# Patient Record
Sex: MALE | Race: WHITE | Employment: OTHER | URBAN - METROPOLITAN AREA
[De-identification: names, ages, dates, MRNs, and addresses within clinical notes are randomized per-mention and may not be internally consistent; named-entity substitution may affect disease eponyms.]

---

## 2022-09-16 ENCOUNTER — HOSPITAL ENCOUNTER (INPATIENT)
Age: 62
LOS: 3 days | Discharge: LEFT BEFORE DISCHARGE | DRG: 917 | End: 2022-09-19
Attending: STUDENT IN AN ORGANIZED HEALTH CARE EDUCATION/TRAINING PROGRAM | Admitting: STUDENT IN AN ORGANIZED HEALTH CARE EDUCATION/TRAINING PROGRAM

## 2022-09-16 ENCOUNTER — APPOINTMENT (OUTPATIENT)
Dept: GENERAL RADIOLOGY | Age: 62
DRG: 917 | End: 2022-09-16
Attending: STUDENT IN AN ORGANIZED HEALTH CARE EDUCATION/TRAINING PROGRAM

## 2022-09-16 DIAGNOSIS — I21.4 NON-STEMI (NON-ST ELEVATED MYOCARDIAL INFARCTION) (HCC): ICD-10-CM

## 2022-09-16 DIAGNOSIS — I21.4 NSTEMI (NON-ST ELEVATED MYOCARDIAL INFARCTION) (HCC): Primary | ICD-10-CM

## 2022-09-16 LAB
ALBUMIN SERPL-MCNC: 3.7 G/DL (ref 3.5–5)
ALBUMIN/GLOB SERPL: 1 {RATIO} (ref 1.1–2.2)
ALP SERPL-CCNC: 94 U/L (ref 45–117)
ALT SERPL-CCNC: 16 U/L (ref 12–78)
AMPHET UR QL SCN: NEGATIVE
ANION GAP SERPL CALC-SCNC: 3 MMOL/L (ref 5–15)
AST SERPL-CCNC: 13 U/L (ref 15–37)
BARBITURATES UR QL SCN: NEGATIVE
BASOPHILS # BLD: 0.1 K/UL (ref 0–0.1)
BASOPHILS NFR BLD: 1 % (ref 0–1)
BENZODIAZ UR QL: NEGATIVE
BILIRUB SERPL-MCNC: 0.3 MG/DL (ref 0.2–1)
BUN SERPL-MCNC: 23 MG/DL (ref 6–20)
BUN/CREAT SERPL: 18 (ref 12–20)
CALCIUM SERPL-MCNC: 9.1 MG/DL (ref 8.5–10.1)
CANNABINOIDS UR QL SCN: POSITIVE
CHLORIDE SERPL-SCNC: 107 MMOL/L (ref 97–108)
CO2 SERPL-SCNC: 27 MMOL/L (ref 21–32)
COCAINE UR QL SCN: POSITIVE
COMMENT, HOLDF: NORMAL
CREAT SERPL-MCNC: 1.3 MG/DL (ref 0.7–1.3)
DIFFERENTIAL METHOD BLD: ABNORMAL
DRUG SCRN COMMENT,DRGCM: ABNORMAL
EOSINOPHIL # BLD: 0.2 K/UL (ref 0–0.4)
EOSINOPHIL NFR BLD: 2 % (ref 0–7)
ERYTHROCYTE [DISTWIDTH] IN BLOOD BY AUTOMATED COUNT: 13 % (ref 11.5–14.5)
ETHANOL SERPL-MCNC: <10 MG/DL
GLOBULIN SER CALC-MCNC: 3.6 G/DL (ref 2–4)
GLUCOSE SERPL-MCNC: 123 MG/DL (ref 65–100)
HCT VFR BLD AUTO: 40.2 % (ref 36.6–50.3)
HGB BLD-MCNC: 13.6 G/DL (ref 12.1–17)
IMM GRANULOCYTES # BLD AUTO: 0.1 K/UL (ref 0–0.04)
IMM GRANULOCYTES NFR BLD AUTO: 1 % (ref 0–0.5)
LYMPHOCYTES # BLD: 1.4 K/UL (ref 0.8–3.5)
LYMPHOCYTES NFR BLD: 12 % (ref 12–49)
MCH RBC QN AUTO: 31.6 PG (ref 26–34)
MCHC RBC AUTO-ENTMCNC: 33.8 G/DL (ref 30–36.5)
MCV RBC AUTO: 93.5 FL (ref 80–99)
METHADONE UR QL: NEGATIVE
MONOCYTES # BLD: 0.8 K/UL (ref 0–1)
MONOCYTES NFR BLD: 7 % (ref 5–13)
NEUTS SEG # BLD: 9.5 K/UL (ref 1.8–8)
NEUTS SEG NFR BLD: 77 % (ref 32–75)
NRBC # BLD: 0 K/UL (ref 0–0.01)
NRBC BLD-RTO: 0 PER 100 WBC
OPIATES UR QL: POSITIVE
PCP UR QL: NEGATIVE
PLATELET # BLD AUTO: 377 K/UL (ref 150–400)
PMV BLD AUTO: 9.3 FL (ref 8.9–12.9)
POTASSIUM SERPL-SCNC: 3.5 MMOL/L (ref 3.5–5.1)
PROT SERPL-MCNC: 7.3 G/DL (ref 6.4–8.2)
RBC # BLD AUTO: 4.3 M/UL (ref 4.1–5.7)
SAMPLES BEING HELD,HOLD: NORMAL
SODIUM SERPL-SCNC: 137 MMOL/L (ref 136–145)
TROPONIN-HIGH SENSITIVITY: 658 NG/L (ref 0–76)
TROPONIN-HIGH SENSITIVITY: 820 NG/L (ref 0–76)
UR CULT HOLD, URHOLD: NORMAL
WBC # BLD AUTO: 12.1 K/UL (ref 4.1–11.1)

## 2022-09-16 PROCEDURE — 65270000046 HC RM TELEMETRY

## 2022-09-16 PROCEDURE — 96372 THER/PROPH/DIAG INJ SC/IM: CPT

## 2022-09-16 PROCEDURE — 85025 COMPLETE CBC W/AUTO DIFF WBC: CPT

## 2022-09-16 PROCEDURE — 96374 THER/PROPH/DIAG INJ IV PUSH: CPT

## 2022-09-16 PROCEDURE — 99285 EMERGENCY DEPT VISIT HI MDM: CPT

## 2022-09-16 PROCEDURE — 74011250636 HC RX REV CODE- 250/636: Performed by: STUDENT IN AN ORGANIZED HEALTH CARE EDUCATION/TRAINING PROGRAM

## 2022-09-16 PROCEDURE — 93005 ELECTROCARDIOGRAM TRACING: CPT

## 2022-09-16 PROCEDURE — 84484 ASSAY OF TROPONIN QUANT: CPT

## 2022-09-16 PROCEDURE — 71045 X-RAY EXAM CHEST 1 VIEW: CPT

## 2022-09-16 PROCEDURE — 82077 ASSAY SPEC XCP UR&BREATH IA: CPT

## 2022-09-16 PROCEDURE — 96375 TX/PRO/DX INJ NEW DRUG ADDON: CPT

## 2022-09-16 PROCEDURE — 80053 COMPREHEN METABOLIC PANEL: CPT

## 2022-09-16 PROCEDURE — 74011250637 HC RX REV CODE- 250/637: Performed by: STUDENT IN AN ORGANIZED HEALTH CARE EDUCATION/TRAINING PROGRAM

## 2022-09-16 PROCEDURE — 36415 COLL VENOUS BLD VENIPUNCTURE: CPT

## 2022-09-16 PROCEDURE — 80307 DRUG TEST PRSMV CHEM ANLYZR: CPT

## 2022-09-16 RX ORDER — FINASTERIDE 5 MG/1
TABLET, FILM COATED ORAL
COMMUNITY

## 2022-09-16 RX ORDER — SODIUM CHLORIDE 9 MG/ML
75 INJECTION, SOLUTION INTRAVENOUS CONTINUOUS
Status: DISCONTINUED | OUTPATIENT
Start: 2022-09-16 | End: 2022-09-19 | Stop reason: HOSPADM

## 2022-09-16 RX ORDER — LANOLIN ALCOHOL/MO/W.PET/CERES
3 CREAM (GRAM) TOPICAL
COMMUNITY
Start: 2022-08-20

## 2022-09-16 RX ORDER — ONDANSETRON 2 MG/ML
4 INJECTION INTRAMUSCULAR; INTRAVENOUS
Status: COMPLETED | OUTPATIENT
Start: 2022-09-16 | End: 2022-09-16

## 2022-09-16 RX ORDER — POLYETHYLENE GLYCOL 3350 17 G/17G
17 POWDER, FOR SOLUTION ORAL DAILY PRN
Status: DISCONTINUED | OUTPATIENT
Start: 2022-09-16 | End: 2022-09-19 | Stop reason: HOSPADM

## 2022-09-16 RX ORDER — ENOXAPARIN SODIUM 100 MG/ML
1 INJECTION SUBCUTANEOUS EVERY 12 HOURS
Status: DISCONTINUED | OUTPATIENT
Start: 2022-09-17 | End: 2022-09-16 | Stop reason: SDUPTHER

## 2022-09-16 RX ORDER — ACETAMINOPHEN 650 MG/1
650 SUPPOSITORY RECTAL
Status: DISCONTINUED | OUTPATIENT
Start: 2022-09-16 | End: 2022-09-19 | Stop reason: HOSPADM

## 2022-09-16 RX ORDER — ONDANSETRON 2 MG/ML
4 INJECTION INTRAMUSCULAR; INTRAVENOUS
Status: DISCONTINUED | OUTPATIENT
Start: 2022-09-16 | End: 2022-09-19 | Stop reason: HOSPADM

## 2022-09-16 RX ORDER — CARVEDILOL 25 MG/1
25 TABLET ORAL
COMMUNITY
Start: 2022-09-12

## 2022-09-16 RX ORDER — SODIUM CHLORIDE 0.9 % (FLUSH) 0.9 %
5-40 SYRINGE (ML) INJECTION EVERY 8 HOURS
Status: DISCONTINUED | OUTPATIENT
Start: 2022-09-16 | End: 2022-09-19 | Stop reason: HOSPADM

## 2022-09-16 RX ORDER — AMLODIPINE BESYLATE 10 MG/1
10 TABLET ORAL
COMMUNITY
Start: 2022-08-19

## 2022-09-16 RX ORDER — ACETAMINOPHEN 325 MG/1
650 TABLET ORAL
Status: DISCONTINUED | OUTPATIENT
Start: 2022-09-16 | End: 2022-09-19 | Stop reason: HOSPADM

## 2022-09-16 RX ORDER — ALBUTEROL SULFATE 90 UG/1
2 AEROSOL, METERED RESPIRATORY (INHALATION)
COMMUNITY

## 2022-09-16 RX ORDER — HEPARIN SODIUM 10000 [USP'U]/100ML
12-25 INJECTION, SOLUTION INTRAVENOUS
Status: CANCELLED | OUTPATIENT
Start: 2022-09-16

## 2022-09-16 RX ORDER — ASPIRIN 81 MG/1
1 TABLET ORAL DAILY
COMMUNITY
Start: 2021-12-10

## 2022-09-16 RX ORDER — ESCITALOPRAM OXALATE 20 MG/1
20 TABLET ORAL
COMMUNITY
Start: 2022-02-16

## 2022-09-16 RX ORDER — ATORVASTATIN CALCIUM 80 MG/1
80 TABLET, FILM COATED ORAL
COMMUNITY
Start: 2022-09-12

## 2022-09-16 RX ORDER — SODIUM CHLORIDE 0.9 % (FLUSH) 0.9 %
5-40 SYRINGE (ML) INJECTION AS NEEDED
Status: DISCONTINUED | OUTPATIENT
Start: 2022-09-16 | End: 2022-09-19 | Stop reason: HOSPADM

## 2022-09-16 RX ORDER — ONDANSETRON 4 MG/1
4 TABLET, ORALLY DISINTEGRATING ORAL
Status: DISCONTINUED | OUTPATIENT
Start: 2022-09-16 | End: 2022-09-19 | Stop reason: HOSPADM

## 2022-09-16 RX ORDER — HEPARIN SODIUM 1000 [USP'U]/ML
60 INJECTION, SOLUTION INTRAVENOUS; SUBCUTANEOUS ONCE
Status: CANCELLED | OUTPATIENT
Start: 2022-09-16 | End: 2022-09-16

## 2022-09-16 RX ORDER — NITROGLYCERIN 0.4 MG/1
0.4 TABLET SUBLINGUAL
Status: COMPLETED | OUTPATIENT
Start: 2022-09-16 | End: 2022-09-16

## 2022-09-16 RX ORDER — MAG HYDROX/ALUMINUM HYD/SIMETH 200-200-20
30 SUSPENSION, ORAL (FINAL DOSE FORM) ORAL
Status: ACTIVE | OUTPATIENT
Start: 2022-09-16 | End: 2022-09-17

## 2022-09-16 RX ORDER — ENOXAPARIN SODIUM 100 MG/ML
1 INJECTION SUBCUTANEOUS EVERY 12 HOURS
Status: DISCONTINUED | OUTPATIENT
Start: 2022-09-16 | End: 2022-09-19

## 2022-09-16 RX ORDER — MORPHINE SULFATE 4 MG/ML
4 INJECTION INTRAVENOUS
Status: COMPLETED | OUTPATIENT
Start: 2022-09-16 | End: 2022-09-16

## 2022-09-16 RX ADMIN — ONDANSETRON 4 MG: 2 INJECTION INTRAMUSCULAR; INTRAVENOUS at 20:43

## 2022-09-16 RX ADMIN — NITROGLYCERIN 0.4 MG: 0.4 TABLET, ORALLY DISINTEGRATING SUBLINGUAL at 20:19

## 2022-09-16 RX ADMIN — ENOXAPARIN SODIUM 70 MG: 100 INJECTION SUBCUTANEOUS at 20:45

## 2022-09-16 RX ADMIN — SODIUM CHLORIDE 75 ML/HR: 9 INJECTION, SOLUTION INTRAVENOUS at 22:26

## 2022-09-16 RX ADMIN — MORPHINE SULFATE 4 MG: 4 INJECTION INTRAVENOUS at 20:42

## 2022-09-16 RX ADMIN — NITROGLYCERIN 0.4 MG: 0.4 TABLET, ORALLY DISINTEGRATING SUBLINGUAL at 20:07

## 2022-09-16 RX ADMIN — NITROGLYCERIN 0.4 MG: 0.4 TABLET, ORALLY DISINTEGRATING SUBLINGUAL at 20:14

## 2022-09-16 NOTE — ED TRIAGE NOTES
TRIAGE NOTE:  Patient arrives by EMS with c/o non-radiating midsternal that started 30 minutes PTA of EMS. Patient reported pain 9/10. See cardiology at the [place]. Patient took 4 baby ASA PTA. EMS administered 100 mcg of fentanyl patient reports pain now a 0/10.

## 2022-09-17 ENCOUNTER — APPOINTMENT (OUTPATIENT)
Dept: NON INVASIVE DIAGNOSTICS | Age: 62
DRG: 917 | End: 2022-09-17
Attending: HOSPITALIST

## 2022-09-17 LAB
ATRIAL RATE: 60 BPM
ATRIAL RATE: 69 BPM
ATRIAL RATE: 72 BPM
CALCULATED P AXIS, ECG09: 55 DEGREES
CALCULATED P AXIS, ECG09: 58 DEGREES
CALCULATED P AXIS, ECG09: 79 DEGREES
CALCULATED R AXIS, ECG10: 47 DEGREES
CALCULATED R AXIS, ECG10: 53 DEGREES
CALCULATED R AXIS, ECG10: 58 DEGREES
CALCULATED T AXIS, ECG11: 122 DEGREES
CALCULATED T AXIS, ECG11: 135 DEGREES
CALCULATED T AXIS, ECG11: 80 DEGREES
COMMENT, HOLDF: NORMAL
DIAGNOSIS, 93000: NORMAL
ECHO AV PEAK GRADIENT: 7 MMHG
ECHO AV PEAK VELOCITY: 1.3 M/S
ECHO AV VELOCITY RATIO: 0.69
ECHO LV E' LATERAL VELOCITY: 4 CM/S
ECHO LV E' SEPTAL VELOCITY: 4 CM/S
ECHO LV FRACTIONAL SHORTENING: 29 % (ref 28–44)
ECHO LV INTERNAL DIMENSION DIASTOLE INDEX: 2.17 CM/M2
ECHO LV INTERNAL DIMENSION DIASTOLIC: 4.1 CM (ref 4.2–5.9)
ECHO LV INTERNAL DIMENSION SYSTOLIC INDEX: 1.53 CM/M2
ECHO LV INTERNAL DIMENSION SYSTOLIC: 2.9 CM
ECHO LV IVSD: 1.1 CM (ref 0.6–1)
ECHO LV MASS 2D: 151.3 G (ref 88–224)
ECHO LV MASS INDEX 2D: 80.1 G/M2 (ref 49–115)
ECHO LV POSTERIOR WALL DIASTOLIC: 1.1 CM (ref 0.6–1)
ECHO LV RELATIVE WALL THICKNESS RATIO: 0.54
ECHO LVOT PEAK GRADIENT: 3 MMHG
ECHO LVOT PEAK VELOCITY: 0.9 M/S
ECHO MV E VELOCITY: 0.32 M/S
ECHO MV E/E' LATERAL: 8
ECHO MV E/E' RATIO (AVERAGED): 8
ECHO MV E/E' SEPTAL: 8
P-R INTERVAL, ECG05: 154 MS
P-R INTERVAL, ECG05: 154 MS
P-R INTERVAL, ECG05: 164 MS
Q-T INTERVAL, ECG07: 384 MS
Q-T INTERVAL, ECG07: 400 MS
Q-T INTERVAL, ECG07: 434 MS
QRS DURATION, ECG06: 74 MS
QRS DURATION, ECG06: 76 MS
QRS DURATION, ECG06: 78 MS
QTC CALCULATION (BEZET), ECG08: 420 MS
QTC CALCULATION (BEZET), ECG08: 428 MS
QTC CALCULATION (BEZET), ECG08: 434 MS
SAMPLES BEING HELD,HOLD: NORMAL
TROPONIN-HIGH SENSITIVITY: 3720 NG/L (ref 0–76)
TROPONIN-HIGH SENSITIVITY: 7043 NG/L (ref 0–76)
VENTRICULAR RATE, ECG03: 60 BPM
VENTRICULAR RATE, ECG03: 69 BPM
VENTRICULAR RATE, ECG03: 72 BPM

## 2022-09-17 PROCEDURE — 65270000046 HC RM TELEMETRY

## 2022-09-17 PROCEDURE — 93005 ELECTROCARDIOGRAM TRACING: CPT

## 2022-09-17 PROCEDURE — 74011000250 HC RX REV CODE- 250: Performed by: STUDENT IN AN ORGANIZED HEALTH CARE EDUCATION/TRAINING PROGRAM

## 2022-09-17 PROCEDURE — 84484 ASSAY OF TROPONIN QUANT: CPT

## 2022-09-17 PROCEDURE — 74011250637 HC RX REV CODE- 250/637: Performed by: INTERNAL MEDICINE

## 2022-09-17 PROCEDURE — 74011250636 HC RX REV CODE- 250/636: Performed by: STUDENT IN AN ORGANIZED HEALTH CARE EDUCATION/TRAINING PROGRAM

## 2022-09-17 PROCEDURE — 74011250637 HC RX REV CODE- 250/637: Performed by: STUDENT IN AN ORGANIZED HEALTH CARE EDUCATION/TRAINING PROGRAM

## 2022-09-17 PROCEDURE — 93306 TTE W/DOPPLER COMPLETE: CPT

## 2022-09-17 PROCEDURE — 36415 COLL VENOUS BLD VENIPUNCTURE: CPT

## 2022-09-17 RX ORDER — ISOSORBIDE DINITRATE 10 MG/1
10 TABLET ORAL 3 TIMES DAILY
Status: DISCONTINUED | OUTPATIENT
Start: 2022-09-17 | End: 2022-09-19 | Stop reason: HOSPADM

## 2022-09-17 RX ORDER — LANOLIN ALCOHOL/MO/W.PET/CERES
3 CREAM (GRAM) TOPICAL
Status: DISCONTINUED | OUTPATIENT
Start: 2022-09-17 | End: 2022-09-19 | Stop reason: HOSPADM

## 2022-09-17 RX ORDER — FINASTERIDE 5 MG/1
5 TABLET, FILM COATED ORAL DAILY
Status: DISCONTINUED | OUTPATIENT
Start: 2022-09-17 | End: 2022-09-19 | Stop reason: HOSPADM

## 2022-09-17 RX ORDER — ATORVASTATIN CALCIUM 40 MG/1
80 TABLET, FILM COATED ORAL DAILY
Status: DISCONTINUED | OUTPATIENT
Start: 2022-09-17 | End: 2022-09-19 | Stop reason: HOSPADM

## 2022-09-17 RX ORDER — AMLODIPINE BESYLATE 5 MG/1
10 TABLET ORAL DAILY
Status: DISCONTINUED | OUTPATIENT
Start: 2022-09-17 | End: 2022-09-19 | Stop reason: HOSPADM

## 2022-09-17 RX ORDER — ESCITALOPRAM OXALATE 10 MG/1
20 TABLET ORAL DAILY
Status: DISCONTINUED | OUTPATIENT
Start: 2022-09-17 | End: 2022-09-19 | Stop reason: HOSPADM

## 2022-09-17 RX ORDER — ASPIRIN 81 MG/1
81 TABLET ORAL DAILY
Status: DISCONTINUED | OUTPATIENT
Start: 2022-09-17 | End: 2022-09-19 | Stop reason: HOSPADM

## 2022-09-17 RX ADMIN — AMLODIPINE BESYLATE 10 MG: 5 TABLET ORAL at 10:01

## 2022-09-17 RX ADMIN — SODIUM CHLORIDE, PRESERVATIVE FREE 10 ML: 5 INJECTION INTRAVENOUS at 15:01

## 2022-09-17 RX ADMIN — SODIUM CHLORIDE, PRESERVATIVE FREE 10 ML: 5 INJECTION INTRAVENOUS at 21:38

## 2022-09-17 RX ADMIN — ESCITALOPRAM OXALATE 20 MG: 10 TABLET ORAL at 10:01

## 2022-09-17 RX ADMIN — ENOXAPARIN SODIUM 70 MG: 100 INJECTION SUBCUTANEOUS at 10:02

## 2022-09-17 RX ADMIN — FINASTERIDE 5 MG: 5 TABLET, FILM COATED ORAL at 10:01

## 2022-09-17 RX ADMIN — SODIUM CHLORIDE, PRESERVATIVE FREE 10 ML: 5 INJECTION INTRAVENOUS at 10:06

## 2022-09-17 RX ADMIN — ASPIRIN 81 MG: 81 TABLET, COATED ORAL at 10:02

## 2022-09-17 RX ADMIN — SODIUM CHLORIDE 75 ML/HR: 9 INJECTION, SOLUTION INTRAVENOUS at 15:00

## 2022-09-17 RX ADMIN — ENOXAPARIN SODIUM 70 MG: 100 INJECTION SUBCUTANEOUS at 21:36

## 2022-09-17 RX ADMIN — ATORVASTATIN CALCIUM 80 MG: 40 TABLET, FILM COATED ORAL at 10:01

## 2022-09-17 RX ADMIN — ISOSORBIDE DINITRATE 10 MG: 10 TABLET ORAL at 15:00

## 2022-09-17 RX ADMIN — ISOSORBIDE DINITRATE 10 MG: 10 TABLET ORAL at 21:36

## 2022-09-17 RX ADMIN — Medication 3 MG: at 21:36

## 2022-09-17 NOTE — PROGRESS NOTES
0200-  TRANSFER - IN REPORT:    Verbal report received from Akilah(name) on Marcelino Villalpando  being received from ED(unit) for routine progression of care      Report consisted of patients Situation, Background, Assessment and   Recommendations(SBAR). Information from the following report(s) ED Summary, MAR, Recent Results, and Cardiac Rhythm SB  was reviewed with the receiving nurse. Opportunity for questions and clarification was provided. Assessment completed upon patients arrival to unit and care assumed. Patient A/O  X4, denies pain, refuses to put gown on, patient unsure of home meds. Patient wants to go to sleep. 0730- Bedside and Verbal shift change report given to Estefany Palomo (oncoming nurse) by Shaheen Zepeda (offgoing nurse). Report included the following information ED Summary, Intake/Output, Recent Results, Cardiac Rhythm SB, and Alarm Parameters .

## 2022-09-17 NOTE — ROUTINE PROCESS
TRANSFER - OUT REPORT:    Verbal report given to Keily Centeno RN(name) on Marcelino Villalpando  being transferred to [facility](unit) for routine progression of care       Report consisted of patients Situation, Background, Assessment and   Recommendations(SBAR). Information from the following report(s) SBAR, ED Summary, Intake/Output, MAR, and Recent Results was reviewed with the receiving nurse. Lines:   Peripheral IV 09/16/22 Left Antecubital (Active)        Opportunity for questions and clarification was provided.       Patient transported with:   Monitor

## 2022-09-17 NOTE — CONSULTS
Cardiology Note:  Imp:  NSTEMI: Recent h/o CP with admission to [facility] and now here. His most recent Hs trop  was 1,043. He has not had any chest pain since yesterday pm.  He is hemodynamically stable. CAD: H/O previous PCI of RCA and distal LAD. On 9/7/22 the patient had a nuclear stress test at the [facility] that showed no perfusion defects and an EF of 64%   On 9/12/22 during his recent admission to [facility] he had coronary CT angiography: his LM, LAD LCX were all patent and without obstructive disease. Recommendations:  Continue Lovenox and antianginal/anti coronary spasm regimen  Cath on Monday or over the weekend if has more chest pain  He has been advised that his substance abuse ( cocaine and tobacco )  can aggravate his underlying CAD and promote intense coronary spasm as well as accelerated atherosclerosis and it would be to his benefit to abstain.    Plans discussed with the patient  Thank you for this referral.  Nisha Canales MD  Interventional Cardiology  [facility]

## 2022-09-17 NOTE — CONSULTS
[address]    Name:  Franc Huizar  MR#:  [id number]  :  1960  ACCOUNT #:  [de-identified]  DATE OF SERVICE:  2022    HISTORY OF PRESENT ILLNESS:  This is a 60-year-old man who came to the emergency room with a chief complaint of chest pain that had been ongoing on and off of the past 2 weeks. He has been hospitalized at the [address] [place] [address] in the last week or so for similar complaints. Records indicate that the patient had a nuclear stress test at the [facility] on 2022 and on 2022, he had a coronary CTA at [facility]. The results indicate no perfusion defects, normal ejection fraction and mild and in some cases moderate nonocclusive disease of his left coronary artery, LAD and circumflex. He has previous stents in his right coronary artery from previous PCI. ALLERGIES:  SHE HAS ALLERGIES TO CODEINE. MEDICATIONS PRIOR TO ADMISSION:  1. Albuterol inhaler. 2.  Melatonin. 3.  Amlodipine 10 mg.  4.  Aspirin 81 mg.  5.  Atorvastatin 80 mg.  6.  Lexapro 20 mg.  7.  Finasteride 5 mg. PHYSICAL EXAMINATION:  GENERAL:  This is a well-developed, thin, healthy-appearing middle-aged male. VITAL SIGNS:  Temperature 97.9, pulse 69, respirations 13, blood pressure 130/72, sats 98% on room air. HEENT:  Unremarkable. NECK:  Supple. No adenopathy. CHEST WALL:  Nontender. LUNGS:  Clear to auscultation and percussion. HEART:  Regular, moderate rhythm. No S3 gallop or friction rub. No thrills, lifts or heaves. ABDOMEN:  Soft, nontender. No bruits. No ascites or palpable masses. NEUROLOGICAL:  The patient is awake, alert and appropriate. No focal motor signs. Normal speech. LABORATORY DATA:  His urine drug screen from 2022 at 2140 hours was positive for opiates, THC and cocaine. Renal function was essentially normal.  His troponin was 658 at 1836 hours yesterday and most recently was 7043 at 07:38 this morning.     IMAGING:  An EKG was done today demonstrates sinus rhythm with sinus arrhythmia, ST-T wave abnormalities suggestive of lateral ischemia. There is no ST-segment elevation. Chest x-ray done yesterday demonstrates interstitial prominence, may represent vascular congestion and or atypical infectious process. IMPRESSION:  1. This patient has had an non-ST segment elevation myocardial infarction. He has not had any chest pain since yesterday evening and he is clinically and hemodynamically stable. 2.  He has recent studies from [facility] that did not suggest any significant obstructive disease. Similarly, nuclear stress test from the [facility] prior to the [facility] admission indicated normal perfusion. His ejection fraction was normal.    RECOMMENDATIONS:  1. The patient is probably on a full dose Lovenox. He is on amlodipine, Lipitor. Would add an isosorbide dinitrate for his anti-spasm effects. 2.  We will plan to perform a coronary angiography on this patient on Monday and we will proceed over the weekend if he has more chest pain or becomes unstable. 3.  I discussed the benefits of abstaining from tobacco and cocaine which can promote coronary spasm and acceleration of atherosclerosis. Plan is discussed with the patient at bedside.   Thank you for this referral.      Marta Romo MD [id number]  D:  09/17/2022 14:45  T:  09/17/2022 16:52  JOB #:  [id number]

## 2022-09-17 NOTE — PROGRESS NOTES
[facility] [facility]                                                                                          Hospitalist Progress Note  Yee Owen MD  Answering service: [phone] or [phone] from in house phone        Date of Service:  2022  NAME:  Roxana Mayfield  :  1960  MRN:  [id number]      Admission Summary:   Roxana Mayfield is a 65 y.o. male with pmh copd, etoh and drug abuse, cad s/p pci who presented to ed with complaints of chest pain. He reports ongoing chest pain that has been intermittent for the last 2 weeks. Has been hospitalized at [facility] and at the [facility] with the last week for similar pain. Admission records are not readily available for review and patient is unable to provide any details on his admission. States he was told nothing and he has no questions. Complains of intermittent sharp, 8/10, nonradiating midsternal left-sided chest pain. Denies having had a catheterization at any recent hospitalization. The patient denies any fever, chills, abdominal pain, nausea, vomiting, cough, congestion, recent illness, palpitations, or dysuria. Remarkable vitals on ER Presentations: bp to 205/106  Labs Remarkable for: wbc 12.1, trop 658  ER Images: none  ER Rx: nitro tab, morphine, lovenox       Interval history / Subjective:    Follow chest pain   No more chest pain or SOB, dizziness  Comfortably laying in bed  Says he had a stress test done about a week ago and was told that was normal  Assessment & Plan:     NSTEMI in the setting of cocaine use  CAD status post PCI /dyslipidemia  -troponins continue to get worse  -Therapeutic Lovenox  -Continue aspirin, Coreg, Lipitor  -Keep n.p.o. till cleared by Cardiology  -Awaiting cardiology     Polysubstance use  -UDS positive for cocaine, opiates and THC  -Likely contributory to anginal symptoms  -Avoid beta-blockers  -Counseled extensively on cessation     NPO     Code status: FULL CODE  Prophylaxis: Lovenox therapeutic    Plan: continue therapeutic lovenox, follow cardiology  Care Plan discussed with: patient  Anticipated Disposition: home      Hospital Problems  Never Reviewed            Codes Class Noted POA    NSTEMI (non-ST elevated myocardial infarction) [facility]) ICD-10-CM: I21.4  ICD-9-CM: 410.70  9/16/2022 Unknown             Review of Systems:   A comprehensive review of systems was negative except for that written in the HPI. Vital Signs:    Last 24hrs VS reviewed since prior progress note. Most recent are:  Visit Vitals  BP (!) 152/84 (BP 1 Location: Right upper arm, BP Patient Position: At rest)   Pulse 74   Temp 98.2 °F (36.8 °C)   Resp 20   Ht 5' 10\" (1.778 m)   Wt 71.4 kg (157 lb 6.5 oz)   SpO2 97%   BMI 22.59 kg/m²         Intake/Output Summary (Last 24 hours) at 9/17/2022 1043  Last data filed at 9/17/2022 0734  Gross per 24 hour   Intake 487.67 ml   Output 800 ml   Net -312.33 ml        Physical Examination:     I had a face to face encounter with this patient and independently examined them on 9/17/2022 as outlined below:          Constitutional:  No acute distress, cooperative, pleasant    ENT:  Oral mucosa moist, oropharynx benign. Resp:  CTA bilaterally. No wheezing/rhonchi/rales. No accessory muscle use. CV:  Regular rhythm, normal rate, no murmurs, gallops, rubs    GI:  Soft, non distended, non tender. normoactive bowel sounds, no hepatosplenomegaly     Musculoskeletal:  No edema, warm, 2+ pulses throughout    Neurologic:  Moves all extremities.   AAOx3, CN II-XII reviewed            Data Review:    Review and/or order of clinical lab test      Labs:     Recent Labs     09/16/22  1836   WBC 12.1*   HGB 13.6   HCT 40.2        Recent Labs     09/16/22 1836      K 3.5      CO2 27   BUN 23*   CREA 1.30   *   CA 9.1     Recent Labs     09/16/22 1836   ALT 16   AP 94   TBILI 0.3   TP 7.3   ALB 3.7   GLOB 3.6     No results for input(s): INR, PTP, APTT, INREXT in the last 72 hours. No results for input(s): FE, TIBC, PSAT, FERR in the last 72 hours. No results found for: FOL, RBCF   No results for input(s): PH, PCO2, PO2 in the last 72 hours. No results for input(s): CPK, CKNDX, TROIQ in the last 72 hours.     No lab exists for component: CPKMB  No results found for: CHOL, CHOLX, CHLST, CHOLV, HDL, HDLP, LDL, LDLC, DLDLP, TGLX, TRIGL, TRIGP, CHHD, CHHDX  No results found for: GLUCPOC  No results found for: COLOR, APPRN, SPGRU, REFSG, GISSEL, PROTU, GLUCU, KETU, BILU, UROU, CONSUELO, LEUKU, GLUKE, EPSU, BACTU, WBCU, RBCU, CASTS, UCRY      Medications Reviewed:     Current Facility-Administered Medications   Medication Dose Route Frequency    amLODIPine (NORVASC) tablet 10 mg  10 mg Oral DAILY    aspirin delayed-release tablet 81 mg  81 mg Oral DAILY    atorvastatin (LIPITOR) tablet 80 mg  80 mg Oral DAILY    escitalopram oxalate (LEXAPRO) tablet 20 mg  20 mg Oral DAILY    finasteride (PROSCAR) tablet 5 mg  5 mg Oral DAILY    melatonin tablet 3 mg  3 mg Oral QHS    enoxaparin (LOVENOX) injection 70 mg  1 mg/kg SubCUTAneous Q12H    sodium chloride (NS) flush 5-40 mL  5-40 mL IntraVENous Q8H    sodium chloride (NS) flush 5-40 mL  5-40 mL IntraVENous PRN    acetaminophen (TYLENOL) tablet 650 mg  650 mg Oral Q6H PRN    Or    acetaminophen (TYLENOL) suppository 650 mg  650 mg Rectal Q6H PRN    polyethylene glycol (MIRALAX) packet 17 g  17 g Oral DAILY PRN    ondansetron (ZOFRAN ODT) tablet 4 mg  4 mg Oral Q8H PRN    Or    ondansetron (ZOFRAN) injection 4 mg  4 mg IntraVENous Q6H PRN    0.9% sodium chloride infusion  75 mL/hr IntraVENous CONTINUOUS     ______________________________________________________________________  EXPECTED LENGTH OF STAY: - - -  ACTUAL LENGTH OF STAY:          1                 Tayler Marlow MD

## 2022-09-17 NOTE — H&P
History & Physical    Primary Care Provider: Other, None, MD  Source of Information: Patient and chart review    History of Presenting Illness:   Sandra Negrete is a 65 y.o. male with pmh copd, etoh and drug abuse, cad s/p pci who presented to ed with complaints of chest pain. He reports ongoing chest pain that has been intermittent for the last 2 weeks. Has been hospitalized at [facility] and at the [facility] with the last week for similar pain. Admission records are not readily available for review and patient is unable to provide any details on his admission. States he was told nothing and he has no questions. Complains of intermittent sharp, 8/10, nonradiating midsternal left-sided chest pain. Denies having had a catheterization at any recent hospitalization. The patient denies any fever, chills, abdominal pain, nausea, vomiting, cough, congestion, recent illness, palpitations, or dysuria. Remarkable vitals on ER Presentations: bp to 205/106  Labs Remarkable for: wbc 12.1, trop 658  ER Images: none  ER Rx: nitro tab, morphine, lovenox     Review of Systems:  A comprehensive review of systems was negative except for that written in the History of Present Illness. History reviewed. No pertinent past medical history. No past surgical history on file. Prior to Admission medications    Not on File     Allergies   Allergen Reactions    Codeine Unknown (comments)      History reviewed. No pertinent family history.      SOCIAL HISTORY:  Patient resides:  Independently x   Assisted Living    SNF    With family care       Smoking history:   None x   Former    Chronic      Alcohol history:   None x   Social    Chronic      Ambulates:   Independently x   w/cane    w/walker    w/wc    CODE STATUS:  DNR    Full x   Other      Objective:     Physical Exam:     Visit Vitals  BP (!) 205/106 (BP 1 Location: Right upper arm, BP Patient Position: At rest;Lying)   Pulse 77   Temp 98 °F (36.7 °C)   Resp 18   Ht 5' 10\" (1.778 m)   Wt 71.4 kg (157 lb 6.5 oz)   SpO2 99%   BMI 22.59 kg/m²      O2 Device: None (Room air)    General:  Alert, cooperative, no distress, appears stated age. Head:  Normocephalic, without obvious abnormality, atraumatic. Eyes:  Conjunctivae/corneas clear. PERRL, EOMs intact. Nose: Nares normal. Septum midline. Mucosa normal.        Neck: Supple, symmetrical, trachea midline, no carotid bruit and no JVD. Lungs:   Clear to auscultation bilaterally. Chest wall:  No tenderness or deformity. Heart:  Regular rate and rhythm, S1, S2 normal, no murmur, click, rub or gallop. Abdomen:   Soft, non-tender. Bowel sounds normal. No masses,  No organomegaly. Extremities: Extremities normal, atraumatic, no cyanosis or edema. Pulses: 2+ and symmetric all extremities. Skin: Skin color, texture, turgor normal. No rashes or lesions   Neurologic: CNII-XII intact. EKG: Normal sinus rhythm nonspecific ST anterior changes. Data Review:     Recent Days:  Recent Labs     09/16/22  1836   WBC 12.1*   HGB 13.6   HCT 40.2        Recent Labs     09/16/22  1836      K 3.5      CO2 27   *   BUN 23*   CREA 1.30   CA 9.1   ALB 3.7   ALT 16     No results for input(s): PH, PCO2, PO2, HCO3, FIO2 in the last 72 hours.     24 Hour Results:  Recent Results (from the past 24 hour(s))   EKG, 12 LEAD, INITIAL    Collection Time: 09/16/22  5:57 PM   Result Value Ref Range    Ventricular Rate 72 BPM    Atrial Rate 72 BPM    P-R Interval 164 ms    QRS Duration 78 ms    Q-T Interval 384 ms    QTC Calculation (Bezet) 420 ms    Calculated P Axis 79 degrees    Calculated R Axis 53 degrees    Calculated T Axis 80 degrees    Diagnosis       Sinus rhythm with marked sinus arrhythmia  Nonspecific ST and T wave abnormality  Abnormal ECG  No previous ECGs available     CBC WITH AUTOMATED DIFF    Collection Time: 09/16/22  6:36 PM   Result Value Ref Range    WBC 12.1 (H) 4.1 - 11.1 K/uL    RBC 4.30 4. 10 - 5.70 M/uL    HGB 13.6 12.1 - 17.0 g/dL    HCT 40.2 36.6 - 50.3 %    MCV 93.5 80.0 - 99.0 FL    MCH 31.6 26.0 - 34.0 PG    MCHC 33.8 30.0 - 36.5 g/dL    RDW 13.0 11.5 - 14.5 %    PLATELET 251 779 - 610 K/uL    MPV 9.3 8.9 - 12.9 FL    NRBC 0.0 0  WBC    ABSOLUTE NRBC 0.00 0.00 - 0.01 K/uL    NEUTROPHILS 77 (H) 32 - 75 %    LYMPHOCYTES 12 12 - 49 %    MONOCYTES 7 5 - 13 %    EOSINOPHILS 2 0 - 7 %    BASOPHILS 1 0 - 1 %    IMMATURE GRANULOCYTES 1 (H) 0.0 - 0.5 %    ABS. NEUTROPHILS 9.5 (H) 1.8 - 8.0 K/UL    ABS. LYMPHOCYTES 1.4 0.8 - 3.5 K/UL    ABS. MONOCYTES 0.8 0.0 - 1.0 K/UL    ABS. EOSINOPHILS 0.2 0.0 - 0.4 K/UL    ABS. BASOPHILS 0.1 0.0 - 0.1 K/UL    ABS. IMM. GRANS. 0.1 (H) 0.00 - 0.04 K/UL    DF AUTOMATED     METABOLIC PANEL, COMPREHENSIVE    Collection Time: 09/16/22  6:36 PM   Result Value Ref Range    Sodium 137 136 - 145 mmol/L    Potassium 3.5 3.5 - 5.1 mmol/L    Chloride 107 97 - 108 mmol/L    CO2 27 21 - 32 mmol/L    Anion gap 3 (L) 5 - 15 mmol/L    Glucose 123 (H) 65 - 100 mg/dL    BUN 23 (H) 6 - 20 MG/DL    Creatinine 1.30 0.70 - 1.30 MG/DL    BUN/Creatinine ratio 18 12 - 20      GFR est AA >60 >60 ml/min/1.73m2    GFR est non-AA 56 (L) >60 ml/min/1.73m2    Calcium 9.1 8.5 - 10.1 MG/DL    Bilirubin, total 0.3 0.2 - 1.0 MG/DL    ALT (SGPT) 16 12 - 78 U/L    AST (SGOT) 13 (L) 15 - 37 U/L    Alk. phosphatase 94 45 - 117 U/L    Protein, total 7.3 6.4 - 8.2 g/dL    Albumin 3.7 3.5 - 5.0 g/dL    Globulin 3.6 2.0 - 4.0 g/dL    A-G Ratio 1.0 (L) 1.1 - 2.2     TROPONIN-HIGH SENSITIVITY    Collection Time: 09/16/22  6:36 PM   Result Value Ref Range    Troponin-High Sensitivity 658 (HH) 0 - 76 ng/L   SAMPLES BEING HELD    Collection Time: 09/16/22  6:36 PM   Result Value Ref Range    SAMPLES BEING HELD 1RED, 1BLU     COMMENT        Add-on orders for these samples will be processed based on acceptable specimen integrity and analyte stability, which may vary by analyte. Imaging:     Assessment:     Mckay Williamson is a 65 y.o. male with pmh copd, etoh and drug abuse, cad s/p pci who is admitted NSTE-ACS       Plan:       NSTE-ACS  -Continue with serial troponins  -Therapeutic Lovenox  -Continue aspirin, Coreg, Lipitor  -Keep n.p.o.  -Cardiology consult in a.m.     Polysubstance abuse  -UDS positive for cocaine, opiates and THC  -Likely contributory to anginal symptoms  -Avoid beta-blockers  -Counseled extensively on cessation    CAD status post PCI /dyslipidemia  -Continue Lipitor, aspirin  -Hold beta-blocker          FEN/GI -  [email or web address]  Activity - as tolerated  DVT prophylaxis - Lovenox  GI prophylaxis -  NI  Disposition - Home    CODE STATUS:  full code       Signed By: Radha Hernandez MD     September 16, 2022

## 2022-09-18 PROCEDURE — 74011250636 HC RX REV CODE- 250/636: Performed by: STUDENT IN AN ORGANIZED HEALTH CARE EDUCATION/TRAINING PROGRAM

## 2022-09-18 PROCEDURE — 74011250637 HC RX REV CODE- 250/637: Performed by: INTERNAL MEDICINE

## 2022-09-18 PROCEDURE — 65270000046 HC RM TELEMETRY

## 2022-09-18 PROCEDURE — 74011250637 HC RX REV CODE- 250/637: Performed by: STUDENT IN AN ORGANIZED HEALTH CARE EDUCATION/TRAINING PROGRAM

## 2022-09-18 PROCEDURE — 74011000250 HC RX REV CODE- 250: Performed by: STUDENT IN AN ORGANIZED HEALTH CARE EDUCATION/TRAINING PROGRAM

## 2022-09-18 RX ADMIN — FINASTERIDE 5 MG: 5 TABLET, FILM COATED ORAL at 08:55

## 2022-09-18 RX ADMIN — ENOXAPARIN SODIUM 70 MG: 100 INJECTION SUBCUTANEOUS at 20:57

## 2022-09-18 RX ADMIN — SODIUM CHLORIDE, PRESERVATIVE FREE 10 ML: 5 INJECTION INTRAVENOUS at 21:00

## 2022-09-18 RX ADMIN — ASPIRIN 81 MG: 81 TABLET, COATED ORAL at 08:56

## 2022-09-18 RX ADMIN — ATORVASTATIN CALCIUM 80 MG: 40 TABLET, FILM COATED ORAL at 08:55

## 2022-09-18 RX ADMIN — Medication 3 MG: at 21:00

## 2022-09-18 RX ADMIN — SODIUM CHLORIDE, PRESERVATIVE FREE 10 ML: 5 INJECTION INTRAVENOUS at 07:09

## 2022-09-18 RX ADMIN — ISOSORBIDE DINITRATE 10 MG: 10 TABLET ORAL at 08:56

## 2022-09-18 RX ADMIN — ISOSORBIDE DINITRATE 10 MG: 10 TABLET ORAL at 21:00

## 2022-09-18 RX ADMIN — ESCITALOPRAM OXALATE 20 MG: 10 TABLET ORAL at 08:56

## 2022-09-18 RX ADMIN — ISOSORBIDE DINITRATE 10 MG: 10 TABLET ORAL at 16:48

## 2022-09-18 RX ADMIN — AMLODIPINE BESYLATE 10 MG: 5 TABLET ORAL at 08:55

## 2022-09-18 RX ADMIN — ENOXAPARIN SODIUM 70 MG: 100 INJECTION SUBCUTANEOUS at 08:57

## 2022-09-18 RX ADMIN — SODIUM CHLORIDE 75 ML/HR: 9 INJECTION, SOLUTION INTRAVENOUS at 07:09

## 2022-09-18 NOTE — PROGRESS NOTES
1930 Bedside and Verbal shift change report given to Reece De La Torre RN (oncoming nurse) by Susana Lowe RN (offgoing nurse). Report included the following information SBAR, Kardex, Intake/Output, MAR, Recent Results, and Cardiac Rhythm SB, NS .     0730 Bedside and Verbal shift change report given to Susana Lowe RN (oncoming nurse) by Reece De La Torre RN (offgoing nurse). Report included the following information SBAR, Kardex, Intake/Output, MAR, Recent Results, and Cardiac Rhythm SB, NS . Problem: Pressure Injury - Risk of  Goal: *Prevention of pressure injury  Description: Document Artemio Scale and appropriate interventions in the flowsheet. Outcome: Progressing Towards Goal  Note: Pressure Injury Interventions:  Sensory Interventions: Check visual cues for pain, Keep linens dry and wrinkle-free, Minimize linen layers, Pressure redistribution bed/mattress (bed type)         Activity Interventions: Assess need for specialty bed, Increase time out of bed, Pressure redistribution bed/mattress(bed type)    Mobility Interventions: Assess need for specialty bed, HOB 30 degrees or less, Pressure redistribution bed/mattress (bed type)    Nutrition Interventions: Document food/fluid/supplement intake, Offer support with meals,snacks and hydration    Friction and Shear Interventions: HOB 30 degrees or less                Problem: Falls - Risk of  Goal: *Absence of Falls  Description: Document Connie Fall Risk and appropriate interventions in the flowsheet.   Outcome: Progressing Towards Goal  Note: Fall Risk Interventions:  Mobility Interventions: Communicate number of staff needed for ambulation/transfer, Patient to call before getting OOB         Medication Interventions: Evaluate medications/consider consulting pharmacy, Patient to call before getting OOB, Teach patient to arise slowly         History of Falls Interventions: Bed/chair exit alarm, Door open when patient unattended, Investigate reason for fall, Room close to nurse's station

## 2022-09-18 NOTE — PROGRESS NOTES
Cardiology Progress Note  2022     Admit Date: 2022  Admit Diagnosis: NSTEMI (non-ST elevated myocardial infarction) ([facility] [address] [I21.4]  CC: none currently    Assessment/Plan:   No CP, dyspnea or orthopnea  Echo showed normal EF and wall motion  Will proceed with cath tomorrow      For other plans, see orders. Subjective: Marcelino Martinez reports   Chest Pain:  [x]  none;  consistent with []  non-cardiac  []  atypical  []  angina             [x]  none now    []  on-going  Dyspnea: [x]  none    []  at rest    []  with exertion   []  improved    []  unchanged    []  worsening  PND:       [x]  none      []  overnight      Orthopnea:   [x]  none        []  improved         []  unchanged        []  worsening  Presyncope: [x]  none        []  improved         []  unchanged        []  worsening  Ambulated in hallway without symptoms  []  Yes  Ambulated in room without symptoms  []  Yes    Objective:    Physical Exam:  Overall VSSAF;  Visit Vitals  BP (!) 173/82   Pulse 62   Temp 98.1 °F (36.7 °C)   Resp 22   Ht 5' 10\" (1.778 m)   Wt 71.2 kg (156 lb 15.5 oz)   SpO2 97%   BMI 22.52 kg/m²     Temp (24hrs), Av.1 °F (36.7 °C), Min:97.9 °F (36.6 °C), Max:98.3 °F (36.8 °C)    Patient Vitals for the past 8 hrs:   Pulse   22 1000 62   22 0708 62   22 0600 (!) 58   22 0400 (!) 59   22 0353 60    Patient Vitals for the past 8 hrs:   Resp   22 0708 22   22 0353 24    Patient Vitals for the past 8 hrs:   BP   22 0708 (!) 173/82   22 0353 (!) 158/78        Intake/Output Summary (Last 24 hours) at 2022 1028  Last data filed at 2022 0708  Gross per 24 hour   Intake 2325 ml   Output 1590 ml   Net 735 ml       General Appearance: Well developed, well nourished, no acute distress. Ears/Nose/Mouth/Throat:   Normal MM; anicteric. JVP: WNL   Resp:   Lungs clear to auscultation bilaterally. Nl resp effort. Cardiovascular:  RRR, S1, S2 normal, no new murmur.  No gallop or rub. Abdomen:   Soft, non-tender, bowel sounds are present. Extremities: No edema bilaterally. Skin:  Neuro: Warm and dry. A/O x3, grossly nonfocal    []  cath site intact w/o hematoma or bruit; distal pulse unchanged. Data Review:     Telemetry independently reviewed : []  sinus      []  chronic afib     []  par afib    []  NSVT    ECG independently reviewed:  []  NSR         []  no significant changes  [] no new ECG provided for review  Lab results reviewed as noted below. Current medications reviewed as noted below. No results for input(s): PH, PCO2, PO2 in the last 72 hours. No results for input(s): CPK, CKMB, TROIQ in the last 72 hours. Recent Labs     09/16/22  1836      K 3.5      CO2 27   BUN 23*   CREA 1.30   *   CA 9.1   ALB 3.7   WBC 12.1*   HGB 13.6   HCT 40.2        Recent Labs     09/16/22  1836   ALT 16   AP 94   TBILI 0.3   TP 7.3   ALB 3.7   GLOB 3.6     No results for input(s): INR, PTP, APTT, INREXT in the last 72 hours. No results for input(s): FE, TIBC, PSAT, FERR in the last 72 hours.    No results found for: GLUCPOC    Current Facility-Administered Medications   Medication Dose Route Frequency    amLODIPine (NORVASC) tablet 10 mg  10 mg Oral DAILY    aspirin delayed-release tablet 81 mg  81 mg Oral DAILY    atorvastatin (LIPITOR) tablet 80 mg  80 mg Oral DAILY    escitalopram oxalate (LEXAPRO) tablet 20 mg  20 mg Oral DAILY    finasteride (PROSCAR) tablet 5 mg  5 mg Oral DAILY    melatonin tablet 3 mg  3 mg Oral QHS    isosorbide dinitrate (ISORDIL) tablet 10 mg  10 mg Oral TID    enoxaparin (LOVENOX) injection 70 mg  1 mg/kg SubCUTAneous Q12H    sodium chloride (NS) flush 5-40 mL  5-40 mL IntraVENous Q8H    sodium chloride (NS) flush 5-40 mL  5-40 mL IntraVENous PRN    acetaminophen (TYLENOL) tablet 650 mg  650 mg Oral Q6H PRN    Or    acetaminophen (TYLENOL) suppository 650 mg  650 mg Rectal Q6H PRN    polyethylene glycol (MIRALAX) packet 17 g  17 g Oral DAILY PRN    ondansetron (ZOFRAN ODT) tablet 4 mg  4 mg Oral Q8H PRN    Or    ondansetron (ZOFRAN) injection 4 mg  4 mg IntraVENous Q6H PRN    0.9% sodium chloride infusion  75 mL/hr IntraVENous CONTINUOUS        Irvin Callahan MD

## 2022-09-18 NOTE — PROGRESS NOTES
[address] [facility]  Hospitalist Group                                                                                          Hospitalist Progress Note  Isabelle Lam MD  Answering service: [phone] OR [phone] from in house phone        Date of Service:  2022  NAME:  Matt Pitt  :  1960  MRN:  [id number]      Admission Summary:   Matt Pitt is a 65 y.o. male with pmh copd, etoh and drug abuse, cad s/p pci who presented to ed with complaints of chest pain. He reports ongoing chest pain that has been intermittent for the last 2 weeks. Has been hospitalized at [address] [place] [address] and at the [facility] with the last week for similar pain. Admission records are not readily available for review and patient is unable to provide any details on his admission. States he was told nothing and he has no questions. Complains of intermittent sharp, 8/10, nonradiating midsternal left-sided chest pain. Denies having had a catheterization at any recent hospitalization. The patient denies any fever, chills, abdominal pain, nausea, vomiting, cough, congestion, recent illness, palpitations, or dysuria. Remarkable vitals on ER Presentations: bp to 205/106  Labs Remarkable for: wbc 12.1, trop 658  ER Images: none  ER Rx: nitro tab, morphine, lovenox       Interval history / Subjective:    Follow chest pain   No more chest pain or SOB, dizziness  Comfortably laying in bed  Says he had a stress test done about a week ago and was told that was normal  Assessment & Plan:     NSTEMI in the setting of cocaine use  CAD status post PCI /dyslipidemia  -troponins continue to get worse  -Therapeutic Lovenox  -Continue aspirin, Coreg, Lipitor, Isordil  -Appreciate Cardiology, cardiac cath tomorrow    HTN: continue Norvasc, Isordil     Polysubstance use  -UDS positive for cocaine, opiates and THC  -Likely contributory to anginal symptoms  -Avoid beta-blockers  -Counseled extensively on cessation     Cardiac diet, NPO from midnight     Code status: FULL CODE  Prophylaxis: Lovenox therapeutic    Plan: continue therapeutic lovenox, follow cardiology  Care Plan discussed with: patient  Anticipated Disposition: home      Hospital Problems  Never Reviewed            Codes Class Noted POA    NSTEMI (non-ST elevated myocardial infarction) [facility]) ICD-10-CM: I21.4  ICD-9-CM: 410.70  9/16/2022 Unknown           Review of Systems:   A comprehensive review of systems was negative except for that written in the HPI. Vital Signs:    Last 24hrs VS reviewed since prior progress note. Most recent are:  Visit Vitals  BP (!) 173/82   Pulse 62   Temp 98.1 °F (36.7 °C)   Resp 22   Ht 5' 10\" (1.778 m)   Wt 71.2 kg (156 lb 15.5 oz)   SpO2 97%   BMI 22.52 kg/m²         Intake/Output Summary (Last 24 hours) at 9/18/2022 1121  Last data filed at 9/18/2022 0900  Gross per 24 hour   Intake 2705 ml   Output 1590 ml   Net 1115 ml          Physical Examination:     I had a face to face encounter with this patient and independently examined them on 9/18/2022 as outlined below:          Constitutional:  No acute distress, cooperative, pleasant    ENT:  Oral mucosa moist, oropharynx benign. Resp:  CTA bilaterally. No wheezing/rhonchi/rales. No accessory muscle use. CV:  Regular rhythm, normal rate, no murmurs, gallops, rubs    GI:  Soft, non distended, non tender. normoactive bowel sounds, no hepatosplenomegaly     Musculoskeletal:  No edema, warm, 2+ pulses throughout    Neurologic:  Moves all extremities.   AAOx3, CN II-XII reviewed            Data Review:    Review and/or order of clinical lab test      Labs:     Recent Labs     09/16/22 1836   WBC 12.1*   HGB 13.6   HCT 40.2          Recent Labs     09/16/22 1836      K 3.5      CO2 27   BUN 23*   CREA 1.30   *   CA 9.1       Recent Labs     09/16/22 1836   ALT 16   AP 94   TBILI 0.3   TP 7.3   ALB 3.7   GLOB 3.6       No results for input(s): INR, PTP, APTT, INREXT, INREXT in the last 72 hours. No results for input(s): FE, TIBC, PSAT, FERR in the last 72 hours. No results found for: FOL, RBCF   No results for input(s): PH, PCO2, PO2 in the last 72 hours. No results for input(s): CPK, CKNDX, TROIQ in the last 72 hours.     No lab exists for component: CPKMB  No results found for: CHOL, CHOLX, CHLST, CHOLV, HDL, HDLP, LDL, LDLC, DLDLP, TGLX, TRIGL, TRIGP, CHHD, CHHDX  No results found for: GLUCPOC  No results found for: COLOR, APPRN, SPGRU, REFSG, GISSEL, PROTU, GLUCU, KETU, BILU, UROU, CONSUELO, LEUKU, GLUKE, EPSU, BACTU, WBCU, RBCU, CASTS, UCRY      Medications Reviewed:     Current Facility-Administered Medications   Medication Dose Route Frequency    amLODIPine (NORVASC) tablet 10 mg  10 mg Oral DAILY    aspirin delayed-release tablet 81 mg  81 mg Oral DAILY    atorvastatin (LIPITOR) tablet 80 mg  80 mg Oral DAILY    escitalopram oxalate (LEXAPRO) tablet 20 mg  20 mg Oral DAILY    finasteride (PROSCAR) tablet 5 mg  5 mg Oral DAILY    melatonin tablet 3 mg  3 mg Oral QHS    isosorbide dinitrate (ISORDIL) tablet 10 mg  10 mg Oral TID    enoxaparin (LOVENOX) injection 70 mg  1 mg/kg SubCUTAneous Q12H    sodium chloride (NS) flush 5-40 mL  5-40 mL IntraVENous Q8H    sodium chloride (NS) flush 5-40 mL  5-40 mL IntraVENous PRN    acetaminophen (TYLENOL) tablet 650 mg  650 mg Oral Q6H PRN    Or    acetaminophen (TYLENOL) suppository 650 mg  650 mg Rectal Q6H PRN    polyethylene glycol (MIRALAX) packet 17 g  17 g Oral DAILY PRN    ondansetron (ZOFRAN ODT) tablet 4 mg  4 mg Oral Q8H PRN    Or    ondansetron (ZOFRAN) injection 4 mg  4 mg IntraVENous Q6H PRN    0.9% sodium chloride infusion  75 mL/hr IntraVENous CONTINUOUS     ______________________________________________________________________  EXPECTED LENGTH OF STAY: - - -  ACTUAL LENGTH OF STAY:          2                 Luca Pop MD

## 2022-09-19 ENCOUNTER — APPOINTMENT (OUTPATIENT)
Dept: VASCULAR SURGERY | Age: 62
DRG: 917 | End: 2022-09-19
Attending: NURSE PRACTITIONER

## 2022-09-19 ENCOUNTER — TRANSCRIBE ORDER (OUTPATIENT)
Dept: CARDIAC REHAB | Age: 62
End: 2022-09-19

## 2022-09-19 VITALS
DIASTOLIC BLOOD PRESSURE: 80 MMHG | SYSTOLIC BLOOD PRESSURE: 147 MMHG | HEART RATE: 80 BPM | RESPIRATION RATE: 25 BRPM | OXYGEN SATURATION: 98 % | BODY MASS INDEX: 22.03 KG/M2 | WEIGHT: 153.88 LBS | TEMPERATURE: 97.3 F | HEIGHT: 70 IN

## 2022-09-19 DIAGNOSIS — I21.4 ACUTE MYOCARDIAL INFARCTION, SUBENDOCARDIAL INFARCTION, INITIAL EPISODE OF CARE (HCC): Primary | ICD-10-CM

## 2022-09-19 LAB
ABO + RH BLD: NORMAL
ANION GAP SERPL CALC-SCNC: 7 MMOL/L (ref 5–15)
APTT PPP: 33.7 SEC (ref 22.1–31)
BLOOD GROUP ANTIBODIES SERPL: NORMAL
BNP SERPL-MCNC: 1312 PG/ML
BUN SERPL-MCNC: 18 MG/DL (ref 6–20)
BUN/CREAT SERPL: 15 (ref 12–20)
CALCIUM SERPL-MCNC: 9 MG/DL (ref 8.5–10.1)
CHLORIDE SERPL-SCNC: 110 MMOL/L (ref 97–108)
CO2 SERPL-SCNC: 23 MMOL/L (ref 21–32)
COMMENT, HOLDF: NORMAL
COMMENT, HOLDF: NORMAL
CREAT SERPL-MCNC: 1.21 MG/DL (ref 0.7–1.3)
ERYTHROCYTE [DISTWIDTH] IN BLOOD BY AUTOMATED COUNT: 12.9 % (ref 11.5–14.5)
EST. AVERAGE GLUCOSE BLD GHB EST-MCNC: 117 MG/DL
GLUCOSE SERPL-MCNC: 117 MG/DL (ref 65–100)
HBA1C MFR BLD: 5.7 % (ref 4–5.6)
HCT VFR BLD AUTO: 40.6 % (ref 36.6–50.3)
HGB BLD-MCNC: 13.3 G/DL (ref 12.1–17)
LEFT CCA DIST DIAS: 11.1 CM/S
LEFT CCA DIST SYS: 44.6 CM/S
LEFT CCA PROX DIAS: 11.8 CM/S
LEFT CCA PROX SYS: 59.5 CM/S
LEFT ECA DIAS: 6.8 CM/S
LEFT ECA SYS: 50.2 CM/S
LEFT GSV ANKLE DIAM: 0.27 CM
LEFT GSV AT KNEE DIAM: 0.31 CM
LEFT GSV BK MID DIAM: 0.26 CM
LEFT GSV BK PROX DIAM: 0.31 CM
LEFT GSV THIGH DIST DIAM: 0.32 CM
LEFT GSV THIGH MID DIAM: 0.35 CM
LEFT GSV THIGH PROX DIAM: 0.48 CM
LEFT ICA DIST DIAS: 23.3 CM/S
LEFT ICA DIST SYS: 61.3 CM/S
LEFT ICA MID DIAS: 26.3 CM/S
LEFT ICA MID SYS: 66.6 CM/S
LEFT ICA PROX DIAS: 12.9 CM/S
LEFT ICA PROX SYS: 34.6 CM/S
LEFT ICA/CCA SYS: 1.5 NO UNITS
LEFT VERTEBRAL DIAS: 15.3 CM/S
LEFT VERTEBRAL SYS: 38.2 CM/S
MAGNESIUM SERPL-MCNC: 2 MG/DL (ref 1.6–2.4)
MCH RBC QN AUTO: 30.8 PG (ref 26–34)
MCHC RBC AUTO-ENTMCNC: 32.8 G/DL (ref 30–36.5)
MCV RBC AUTO: 94 FL (ref 80–99)
NRBC # BLD: 0 K/UL (ref 0–0.01)
NRBC BLD-RTO: 0 PER 100 WBC
PLATELET # BLD AUTO: 394 K/UL (ref 150–400)
PMV BLD AUTO: 10.2 FL (ref 8.9–12.9)
POTASSIUM SERPL-SCNC: 3.6 MMOL/L (ref 3.5–5.1)
RBC # BLD AUTO: 4.32 M/UL (ref 4.1–5.7)
RIGHT CCA DIST DIAS: 12.7 CM/S
RIGHT CCA DIST SYS: 49.4 CM/S
RIGHT CCA PROX DIAS: 15.1 CM/S
RIGHT CCA PROX SYS: 79 CM/S
RIGHT ECA DIAS: 11.9 CM/S
RIGHT ECA SYS: 72.1 CM/S
RIGHT GSV ANKLE DIAM: 0.26 CM
RIGHT GSV AT KNEE DIAM: 0.3 CM
RIGHT GSV BK MID DIAM: 0.22 CM
RIGHT GSV BK PROX DIAM: 0.31 CM
RIGHT GSV THIGH DIST DIAM: 0.32 CM
RIGHT GSV THIGH MID DIAM: 0.3 CM
RIGHT GSV THIGH PROX DIAM: 0.47 CM
RIGHT ICA DIST DIAS: 19.4 CM/S
RIGHT ICA DIST SYS: 50.7 CM/S
RIGHT ICA MID DIAS: 19.6 CM/S
RIGHT ICA MID SYS: 61.1 CM/S
RIGHT ICA PROX DIAS: 16.1 CM/S
RIGHT ICA PROX SYS: 46.7 CM/S
RIGHT ICA/CCA SYS: 1.2 NO UNITS
RIGHT SSV PROX DIAM: 0.16 CM
RIGHT VERTEBRAL DIAS: 0 CM/S
RIGHT VERTEBRAL SYS: 14.7 CM/S
SAMPLES BEING HELD,HOLD: NORMAL
SAMPLES BEING HELD,HOLD: NORMAL
SODIUM SERPL-SCNC: 140 MMOL/L (ref 136–145)
SPECIMEN EXP DATE BLD: NORMAL
THERAPEUTIC RANGE,PTTT: ABNORMAL SECS (ref 58–77)
TROPONIN-HIGH SENSITIVITY: 1740 NG/L (ref 0–76)
TSH SERPL DL<=0.05 MIU/L-ACNC: 2.63 UIU/ML (ref 0.36–3.74)
WBC # BLD AUTO: 8.8 K/UL (ref 4.1–11.1)

## 2022-09-19 PROCEDURE — 4A023N7 MEASUREMENT OF CARDIAC SAMPLING AND PRESSURE, LEFT HEART, PERCUTANEOUS APPROACH: ICD-10-PCS | Performed by: INTERNAL MEDICINE

## 2022-09-19 PROCEDURE — 94762 N-INVAS EAR/PLS OXIMTRY CONT: CPT

## 2022-09-19 PROCEDURE — 83036 HEMOGLOBIN GLYCOSYLATED A1C: CPT

## 2022-09-19 PROCEDURE — 74011250637 HC RX REV CODE- 250/637: Performed by: STUDENT IN AN ORGANIZED HEALTH CARE EDUCATION/TRAINING PROGRAM

## 2022-09-19 PROCEDURE — 93880 EXTRACRANIAL BILAT STUDY: CPT

## 2022-09-19 PROCEDURE — B2111ZZ FLUOROSCOPY OF MULTIPLE CORONARY ARTERIES USING LOW OSMOLAR CONTRAST: ICD-10-PCS | Performed by: INTERNAL MEDICINE

## 2022-09-19 PROCEDURE — 80048 BASIC METABOLIC PNL TOTAL CA: CPT

## 2022-09-19 PROCEDURE — 74011250636 HC RX REV CODE- 250/636: Performed by: INTERNAL MEDICINE

## 2022-09-19 PROCEDURE — 93970 EXTREMITY STUDY: CPT

## 2022-09-19 PROCEDURE — 83880 ASSAY OF NATRIURETIC PEPTIDE: CPT

## 2022-09-19 PROCEDURE — 74011250636 HC RX REV CODE- 250/636: Performed by: STUDENT IN AN ORGANIZED HEALTH CARE EDUCATION/TRAINING PROGRAM

## 2022-09-19 PROCEDURE — 77030041063 HC CATH DX ANGI DXTRTY MEDT -A: Performed by: INTERNAL MEDICINE

## 2022-09-19 PROCEDURE — 77030013744: Performed by: INTERNAL MEDICINE

## 2022-09-19 PROCEDURE — 85027 COMPLETE CBC AUTOMATED: CPT

## 2022-09-19 PROCEDURE — B2151ZZ FLUOROSCOPY OF LEFT HEART USING LOW OSMOLAR CONTRAST: ICD-10-PCS | Performed by: INTERNAL MEDICINE

## 2022-09-19 PROCEDURE — 99152 MOD SED SAME PHYS/QHP 5/>YRS: CPT | Performed by: INTERNAL MEDICINE

## 2022-09-19 PROCEDURE — C1894 INTRO/SHEATH, NON-LASER: HCPCS | Performed by: INTERNAL MEDICINE

## 2022-09-19 PROCEDURE — 74011250637 HC RX REV CODE- 250/637: Performed by: INTERNAL MEDICINE

## 2022-09-19 PROCEDURE — 93458 L HRT ARTERY/VENTRICLE ANGIO: CPT | Performed by: INTERNAL MEDICINE

## 2022-09-19 PROCEDURE — 36415 COLL VENOUS BLD VENIPUNCTURE: CPT

## 2022-09-19 PROCEDURE — 76937 US GUIDE VASCULAR ACCESS: CPT | Performed by: INTERNAL MEDICINE

## 2022-09-19 PROCEDURE — C1769 GUIDE WIRE: HCPCS | Performed by: INTERNAL MEDICINE

## 2022-09-19 PROCEDURE — 99153 MOD SED SAME PHYS/QHP EA: CPT | Performed by: INTERNAL MEDICINE

## 2022-09-19 PROCEDURE — 84484 ASSAY OF TROPONIN QUANT: CPT

## 2022-09-19 PROCEDURE — 84443 ASSAY THYROID STIM HORMONE: CPT

## 2022-09-19 PROCEDURE — 86900 BLOOD TYPING SEROLOGIC ABO: CPT

## 2022-09-19 PROCEDURE — 74011000250 HC RX REV CODE- 250: Performed by: INTERNAL MEDICINE

## 2022-09-19 PROCEDURE — 74011000250 HC RX REV CODE- 250: Performed by: STUDENT IN AN ORGANIZED HEALTH CARE EDUCATION/TRAINING PROGRAM

## 2022-09-19 PROCEDURE — 85730 THROMBOPLASTIN TIME PARTIAL: CPT

## 2022-09-19 PROCEDURE — 74011000636 HC RX REV CODE- 636: Performed by: INTERNAL MEDICINE

## 2022-09-19 PROCEDURE — 83735 ASSAY OF MAGNESIUM: CPT

## 2022-09-19 PROCEDURE — 2709999900 HC NON-CHARGEABLE SUPPLY: Performed by: INTERNAL MEDICINE

## 2022-09-19 RX ORDER — HEPARIN SODIUM 10000 [USP'U]/100ML
18-36 INJECTION, SOLUTION INTRAVENOUS
Status: DISCONTINUED | OUTPATIENT
Start: 2022-09-19 | End: 2022-09-19 | Stop reason: DRUGHIGH

## 2022-09-19 RX ORDER — FENTANYL CITRATE 50 UG/ML
INJECTION, SOLUTION INTRAMUSCULAR; INTRAVENOUS AS NEEDED
Status: DISCONTINUED | OUTPATIENT
Start: 2022-09-19 | End: 2022-09-19 | Stop reason: HOSPADM

## 2022-09-19 RX ORDER — CARVEDILOL 6.25 MG/1
6.25 TABLET ORAL 2 TIMES DAILY WITH MEALS
Status: DISCONTINUED | OUTPATIENT
Start: 2022-09-19 | End: 2022-09-19 | Stop reason: HOSPADM

## 2022-09-19 RX ORDER — LIDOCAINE HYDROCHLORIDE 10 MG/ML
INJECTION INFILTRATION; PERINEURAL AS NEEDED
Status: DISCONTINUED | OUTPATIENT
Start: 2022-09-19 | End: 2022-09-19 | Stop reason: HOSPADM

## 2022-09-19 RX ORDER — HEPARIN SODIUM 10000 [USP'U]/100ML
12-25 INJECTION, SOLUTION INTRAVENOUS
Status: DISCONTINUED | OUTPATIENT
Start: 2022-09-19 | End: 2022-09-19

## 2022-09-19 RX ORDER — HEPARIN SODIUM 10000 [USP'U]/100ML
12-25 INJECTION, SOLUTION INTRAVENOUS
Status: DISCONTINUED | OUTPATIENT
Start: 2022-09-19 | End: 2022-09-19 | Stop reason: HOSPADM

## 2022-09-19 RX ORDER — MIDAZOLAM HYDROCHLORIDE 1 MG/ML
INJECTION, SOLUTION INTRAMUSCULAR; INTRAVENOUS AS NEEDED
Status: DISCONTINUED | OUTPATIENT
Start: 2022-09-19 | End: 2022-09-19 | Stop reason: HOSPADM

## 2022-09-19 RX ADMIN — SODIUM CHLORIDE, PRESERVATIVE FREE 10 ML: 5 INJECTION INTRAVENOUS at 06:00

## 2022-09-19 RX ADMIN — SODIUM CHLORIDE, PRESERVATIVE FREE 10 ML: 5 INJECTION INTRAVENOUS at 08:50

## 2022-09-19 RX ADMIN — ISOSORBIDE DINITRATE 10 MG: 10 TABLET ORAL at 08:20

## 2022-09-19 RX ADMIN — AMLODIPINE BESYLATE 10 MG: 5 TABLET ORAL at 08:19

## 2022-09-19 RX ADMIN — ESCITALOPRAM OXALATE 20 MG: 10 TABLET ORAL at 08:19

## 2022-09-19 RX ADMIN — ASPIRIN 81 MG: 81 TABLET, COATED ORAL at 08:19

## 2022-09-19 RX ADMIN — FINASTERIDE 5 MG: 5 TABLET, FILM COATED ORAL at 08:19

## 2022-09-19 RX ADMIN — ATORVASTATIN CALCIUM 80 MG: 40 TABLET, FILM COATED ORAL at 08:19

## 2022-09-19 RX ADMIN — SODIUM CHLORIDE 75 ML/HR: 9 INJECTION, SOLUTION INTRAVENOUS at 08:51

## 2022-09-19 NOTE — PROGRESS NOTES
TRANSFER - OUT REPORT:    Verbal report given to MONSERRAT Kuhn(name) on Marcelino Villalpando  being transferred to [facility](unit) for routine progression of care       Report consisted of patients Situation, Background, Assessment and   Recommendations(SBAR). Information from the following report(s) Procedure Summary, Intake/Output, MAR, Recent Results, Med Rec Status, and Cardiac Rhythm Sinus  was reviewed with the receiving nurse. Lines:   Peripheral IV 09/16/22 Left Antecubital (Active)   Site Assessment Clean, dry, & intact 09/19/22 1133   Phlebitis Assessment 0 09/19/22 1133   Infiltration Assessment 0 09/19/22 1133   Dressing Status Clean, dry, & intact 09/19/22 1133   Dressing Type Transparent 09/19/22 1133   Hub Color/Line Status Green;Flushed; Infusing 09/19/22 1133   Action Taken Open ports on tubing capped 09/19/22 0858   Alcohol Cap Used Yes 09/19/22 1133        Opportunity for questions and clarification was provided.       Patient transported with:   Monitor  Registered Nurse

## 2022-09-19 NOTE — DISCHARGE SUMMARY
Code [place] called for irate behavior  It was reported that he He eloped from the hospital with out signing [place] form   Escorted by security

## 2022-09-19 NOTE — FORENSIC NURSE
FNE responded to Code [place]. Patient leaving AMA. Security to escort patient out.
,[email or web address],DirectAddress_Unknown

## 2022-09-19 NOTE — PROGRESS NOTES
TRANSFER - IN REPORT:    Verbal report received from MONSERRAT Varghese(name) on Marcelino Villalpando  being received from [facility](unit) for ordered procedure      Report consisted of patients Situation, Background, Assessment and   Recommendations(SBAR). Information from the following report(s) SBAR, Intake/Output, MAR, and Cardiac Rhythm Sinus  was reviewed with the receiving nurse. Opportunity for questions and clarification was provided. Assessment completed upon patients arrival to unit at [place] [address] [facility] and care assumed.

## 2022-09-19 NOTE — PROGRESS NOTES
TRANSFER - IN REPORT:    Verbal report received from Wing Girard on Ceasar Lung  being received from Procedure for routine post - op. Report consisted of patients Situation, Background, Assessment and Recommendations(SBAR). Information from the following report(s) Procedure Summary, Intake/Output, MAR, and Cardiac Rhythm Sinus  was reviewed with the receiving clinician. Opportunity for questions and clarification was provided. Assessment completed upon patients arrival to [address] [place] [address] and care assumed. Cardiac Cath Lab Recovery Arrival Note:    Ceasar Lung arrived to [facility] recovery area. Patient procedure= LHC. Patient on cardiac monitor, non-invasive blood pressure, SPO2 monitor. On room air. IV  of NS on pump at 75 ml/hr. Patient status doing well without problems. Patient is A&Ox 4. Patient reports No complaints. PROCEDURE SITE CHECK:    Procedure site:without any bleeding and no hematoma, no pain/discomfort reported at procedure site. No change in patient status. Continue to monitor patient and status.     Urinial provided

## 2022-09-19 NOTE — PROGRESS NOTES
9/19/2022; 1510 -   CM received call regarding discharge transportation needs. Nursing confirmed that patient is ambulatory, preferred discharge location address, that patient will have access to the discharge location/someone will be available to receive the patient at the discharge location. CM submitted transport request to [facility] for a Lyft transport with  from the ED Entrance, for an estimated cost of $28-32. Transport updates to be provided to [facility] with vehicle details. CM Team to continue following as needed.     CRM: Yary Ward MPH, [facility] [address].: [phone] or [phone]

## 2022-09-19 NOTE — PROGRESS NOTES
Charting and patient care of Marcelino Villalpando by Alisa Cota Student Nurse from 3236 to 0800 was supervised and reviewed by this RN.

## 2022-09-19 NOTE — PROGRESS NOTES
1940 Bedside and Verbal shift change report given to Puja Strauss RN and Jacob Garner student RN (oncoming nurse) by Conrado Whipple (offgoing nurse). Report included the following information SBAR, Kardex, Intake/Output, MAR, Recent Results, and Cardiac Rhythm NSR .   0750 Bedside and Verbal shift change report given to MONSERRAT Kuhn (oncoming nurse) by Puja Strauss RN and danitza Cardona RN (offgoing nurse). Report included the following information SBAR, Kardex, Intake/Output, MAR, Recent Results, and Cardiac Rhythm NSR . Problem: Pressure Injury - Risk of  Goal: *Prevention of pressure injury  Description: Document Artemio Scale and appropriate interventions in the flowsheet. Outcome: Progressing Towards Goal  Note: Pressure Injury Interventions:  Sensory Interventions: Check visual cues for pain, Keep linens dry and wrinkle-free, Minimize linen layers, Pressure redistribution bed/mattress (bed type)         Activity Interventions: Increase time out of bed    Mobility Interventions: PT/OT evaluation    Nutrition Interventions: Document food/fluid/supplement intake    Friction and Shear Interventions: HOB 30 degrees or less                Problem: Patient Education: Go to Patient Education Activity  Goal: Patient/Family Education  Outcome: Progressing Towards Goal     Problem: Falls - Risk of  Goal: *Absence of Falls  Description: Document Connie Fall Risk and appropriate interventions in the flowsheet.   Outcome: Progressing Towards Goal  Note: Fall Risk Interventions:  Mobility Interventions: Communicate number of staff needed for ambulation/transfer         Medication Interventions: Teach patient to arise slowly         History of Falls Interventions: Bed/chair exit alarm         Problem: Patient Education: Go to Patient Education Activity  Goal: Patient/Family Education  Outcome: Progressing Towards Goal

## 2022-09-19 NOTE — PROGRESS NOTES
0730: Bedside and Verbal shift change report given to MONSERRAT Kuhn (oncoming nurse) by Mikayla Stiles RN (offgoing nurse). Report included the following information SBAR, Kardex, Intake/Output, MAR, Recent Results, and Quality Measures. 1500: Pt yelling at multiple staff members, this RN attempted to de-escalate multiple times throughout shift with no success. Charge RN attempted to de-escalate when pt began to push bedside tray and curse at staff. Charge RN called code atlas. 1511: Pt adamant on leaving AMA and took his own PIV out. MD Bria Ayala, and NP Spencer Zelaya notified. Pt refused to wait for MD to come to bedside. Pt refused to sign AMA form. RN explained risks of leaving AMA including death. Pt arm band cut off and pt escorted outside.

## 2022-09-19 NOTE — PROGRESS NOTES
Cardiac Cath Lab Recovery Arrival Note:      Roxana Mayfield arrived to Cardiac Cath Lab, Recovery Area. Staff introduced to patient. Patient identifiers verified with NAME and DATE OF BIRTH. Procedure verified with patient. Consent forms reviewed and signed by patient or authorized representative and verified. Allergies verified. Patient and family oriented to department. Patient and family informed of procedure and plan of care. Questions answered with review. Patient prepped for procedure, per orders from physician, prior to arrival.    Patient on cardiac monitor, non-invasive blood pressure, SPO2 monitor. On room air. Patient is A&Ox 4. Patient reports no complaints. Patient in stretcher, in low position, with side rails up, call bell within reach, patient instructed to call if assistance as needed. Patient prep in: [address], [place] [address].      Prep by: Prashant Sibley

## 2022-09-19 NOTE — PROGRESS NOTES
Problem: Pressure Injury - Risk of  Goal: *Prevention of pressure injury  Description: Document Artemio Scale and appropriate interventions in the flowsheet. Outcome: Resolved/Not Met     Problem: Patient Education: Go to Patient Education Activity  Goal: Patient/Family Education  Outcome: Resolved/Not Met     Problem: Falls - Risk of  Goal: *Absence of Falls  Description: Document Nadya Counts Fall Risk and appropriate interventions in the flowsheet.   Outcome: Resolved/Not Met     Problem: Patient Education: Go to Patient Education Activity  Goal: Patient/Family Education  Outcome: Resolved/Not Met

## 2022-09-19 NOTE — CARDIO/PULMONARY
Cardiac Rehab: MI education folder, to bedside of Marcelino Villalpando. Visited to discuss the MI and location for OP Cardiac Rehab. Marcelino Villalpando immediately after my introduction asked about his lunch. Alerted his primary nurse about his request for lunch. Atempted Education using teach back method. Reviewed MI diagnosis definition. He does not make eye contact and does not know whether he needs all OP services thru the [place]. Emphasized the value of cardiac rehab. Will continue to follow for CT Surgery consult and plan of care.  [facility] Cardiac Rehab contact information is now on his AVS.    Kareen Bowles RN

## 2022-09-19 NOTE — CONSULTS
[facility]   History and Physical    Subjective:      Edward Mendoza is a 65 y.o. man with CAD and prior PCI/stent to RCA, COPD, HLD, HTN, mobility issues, substance abuse, current everyday smoker, and hepatitis B history, who presented with a NSTEMI. He reports about 2 weeks of severe SSCP, pressure and sharp pain both. He has been seen at the [address] [place] [address] recently for chest pain. He was diagnosed with coronary artery spasm secondary to cocaine use. The pain does not radiate. It is intermittent. He can not say the duration. No associated diaphoresis, nausea, SOB. On admission his tox screen was positive for cocaine, opiates, and THC. Troponins peaked at 7,043. He is single. No children. He is somewhat disenfranchised. He lives at a [place] group home. He has no one to take care of him. He reports being nearly wheel chair or scooter bound. He reports he cannot walk far due to hip pain and sciatica. Cardiac Testing    Cardiac catheterization:multivessel disease, preserved EF. ECHO: 9-17-22     Left Ventricle: Normal left ventricular systolic function with a visually estimated EF of 60 - 65%. Not well visualized. Left ventricle size is normal. Mildly increased wall thickness. Grossly normal wall motion. Diastolic dysfunction present with normal LV EF. Mitral Valve: Mild regurgitation. Technical qualifiers: Echo study was technically difficult. Echo Findings    Left Ventricle Normal left ventricular systolic function with a visually estimated EF of 60 - 65%. Not well visualized. Left ventricle size is normal. Mildly increased wall thickness. Grossly normal wall motion. Diastolic dysfunction present with normal LV EF. Left Atrium Left atrium size is normal.   Right Ventricle Right ventricle size is normal. Normal systolic function. Right Atrium Right atrium size is normal.   Aortic Valve Valve structure is normal. No regurgitation. No stenosis.    Mitral Valve Valve structure is normal. Mild regurgitation. No stenosis noted. Tricuspid Valve Valve structure is normal. Trace regurgitation. No stenosis noted. Pulmonic Valve The pulmonic valve was not well visualized. Aorta Not well visualized. Pericardium No pericardial effusion. Surgical history:  Bilateral cataracts in his 19's (lived in [place])  Umbilical hernia repair with mesh. Does not remember when. Social History   Single  No children    Tobacco Use    Smoking status: Daily. 1ppd for 50 years. Smokeless tobacco: Not on file   Substance Use Topics    Marijuana  Yes    Alcohol use: Not on file    Cocaine, opiates States he can't remember. History reviewed. No pertinent family history. Prior to Admission medications    Medication Sig Start Date End Date Taking? Authorizing Provider   melatonin 3 mg tablet 3 mg. 8/20/22  Yes Provider, Historical   amLODIPine (NORVASC) 10 mg tablet 10 mg. 8/19/22  Yes Provider, Historical   aspirin delayed-release 81 mg tablet Take 1 Tablet by mouth daily. 12/10/21  Yes Provider, Historical   atorvastatin (LIPITOR) 80 mg tablet 80 mg. 9/12/22  Yes Provider, Historical   escitalopram oxalate (LEXAPRO) 20 mg tablet 20 mg. 2/16/22  Yes Provider, Historical   finasteride (PROSCAR) 5 mg tablet TAKE ONE TABLET BY MOUTH ONCE DAILY FOR PROSTATE. Yes Provider, Historical   albuterol (PROVENTIL HFA, VENTOLIN HFA, PROAIR HFA) 90 mcg/actuation inhaler Take 2 Puffs by inhalation every six (6) hours as needed. Provider, Historical   carvediloL (COREG) 25 mg tablet 25 mg. 9/12/22   Provider, Historical       Allergies   Allergen Reactions    Codeine Unknown (comments)             Review of Systems:   Consititutional: Denies fever or chills. Never had COVID. Got the vaccine. +fatigue. Eyes:  Denies use of glasses or vision problems. LIved in [place] and had cataract surgery in his 20's. ENT:  Denies hearing or swallowing difficulty. No sinus infections. Lacks teeth.   CV: Intermittent SSCP without radiation, diaphoresis, nausea. + claudication, HTN. Resp: + dyspnea, no  productive cough. Denies sleep apnea. : Denies dialysis or kidney problems. No dysuria or hematuria. + nocturia. GI: Denies ulcers, esophageal strictures. + history hepatitis B. No nausea, vomiting, diarrhea, constipation. No melena or hematochezia. M/S: Denies joint or bone problems, or implanted artificial hardware. Skin: Denies varicose veins, edema. Neuro: Denies strokes, or TIAs. No headaches. No syncope.  + dizziness. Psych: Denies anxiety or depression. Endocrine: Denies thyroid problems or diabetes. Heme/Lymphatic: +  easy bruising/bleeding      Objective:     VS: Blood pressure (!) 147/80, pulse 62, temperature 98 °F (36.7 °C), resp. rate 20, height 5' 10\" (1.778 m), weight 153 lb 14.1 oz (69.8 kg), SpO2 98 %. Physical Exam:    General appearance: alert, cooperative, no distress. Not forthcoming with information. Poor recall. Head: normocephalic, without obvious abnormality; atraumatic  Eyes: conjunctivae/corneas clear; EOM's intact. Nose: nares normal; no drainage. Hearing: intact   Neck: soft carotid bruit right neck. no JVD  Lungs: clear to auscultation bilaterally, anterior chest. Unlabored nearly flat on room air. Heart: regular rate and rhythm; no murmur. Abdomen: soft, non-tender; bowel sounds normal. Old surgical scar below the umbilicus. Extremities: moves all extremities; no weakness. Skin: Skin color normal; No varicose veins or edema. Sparse hair distribution legs. Neurologic: Grossly normal. Tongue midline, smile symmetrical, shoulder shrug symmetrical, hand grasps equal and strong. Labs:   Recent Labs     09/16/22  1836   WBC 12.1*   HGB 13.6   HCT 40.2         K 3.5   BUN 23*   CREA 1.30   *       Diagnostics:   Vein mapping:   The right great saphenous vein appears adequately sized for bypass use from the saphenofemoral junction to the knee; marginal from knee to ankle. The left great saphenous vein appears adequately sized for bypass use from the saphenofemoral junction to the upper calf; marginal from upper calf to ankle. Chest Xray:  IMPRESSION  1. Interstitial prominence may represent vascular congestion and/or atypical  infectious process. Carotid doppler:   Minimal stenosis of the right and left internal carotid artery. The right vertebral artery is hypoplastic with high resistance flow. The left vertebral artery is dominant with a normal antegrade flow pattern. PFTS-FEV1: ordered, not done     EKG: Normal sinus rhythm with sinus arrhythmia   ST & T wave abnormality, consider lateral ischemia   When compared with ECG of 17-SEP-2022 12:23,   No significant change was found      Assessment:     Active Problems:    NSTEMI (non-ST elevated myocardial infarction) ([facility] [address] (9/16/2022)        Plan:   The risk and benefit of surgery were reviewed with patient and family and all questions answered and the patient wishes to proceed. Risk include infection, bleeding, stroke, heart attack, irregular heart rhythm, kidney failure and death. Treatment Plan:    The patient left AMA before a definitive plan was reached. He was told by the surgeon, during the consult, that without treatment he could have a fatal heart attack.        Signed By: Mike Hu NP     September 19, 2022

## 2022-09-19 NOTE — PROGRESS NOTES
SHEATH PULL NOTE:    Patient informed of procedure with questions answered with review. Sheath site prepped with Chloraprep swab. 6 fr sheath in Right groin pulled by MONSERRAT Adams. Hand hold and gauze, with manual compression to site. No bleeding, no hematoma, no pain at site. Hemostasis obtained with hand hold/manual compression at site. Patient tolerated well. No change in status. Handhold for 15 minutes. No change at site. Tegaderm and quickclott dressing applied to site. No bleeding, no hematoma, no pain/discomfort at site. Groin instructions provided with review. Continue to monitor procedure site and patient status. *Advised patient to keep head flat and extremity flat to decrease risk of bleeding. *Recommended that patient not drink for ONE HOUR post sheath pull completion. *Recommended that patient not eat for TWO HOURS post sheath pull completion. *Instructed patient on rationale for delay of PO products to decrease risk for aspiration and if additional treatment to procedure site is required. Patient verbalized understanding of instructions with review.

## 2022-09-19 NOTE — PROGRESS NOTES
Cardiac Cath Lab Procedure Area Arrival Note:    Marta Watson arrived to Cardiac Cath Lab, Procedure Area. Patient identifiers verified with NAME and DATE OF BIRTH. Procedure verified with patient. Consent forms verified. Allergies verified. Patient informed of procedure and plan of care. Questions answered with review. Patient voiced understanding of procedure and plan of care. Patient on cardiac monitor, non-invasive blood pressure, SPO2 monitor. On O2 @ 2 lpm via nasal cannula. IV of NS on pump at 50 ml/hr. Patient status doing well without problems. Patient is A&Ox 4. Patient reports no complaints at this time. Patient medicated during procedure with orders obtained and verified by Dr. Og Riley. Refer to patients Cardiac Cath Lab PROCEDURE REPORT for vital signs, assessment, status, and response during procedure, printed at end of case. Printed report on chart or scanned into chart. Transfer to [address] [place] [address] from Procedure Area    Verbal report given to Allan Wilkins on Marta Watson being transferred to Cardiac Cath Lab  for routine progression of care   Patient is post [facility] procedure. Patient stable upon transfer to . Report consisted of patients Situation, Background, Assessment and   Recommendations(SBAR). Information from the following report(s) SBAR, Procedure Summary, and MAR was reviewed with the receiving nurse. Opportunity for questions and clarification was provided. Patient medicated during procedure with orders obtained and verified by Dr. Og Riley. Refer to patient PROCEDURE REPORT for vital signs, assessment, status, and response during procedure.

## 2022-09-30 NOTE — PROGRESS NOTES
Physician Progress Note      Ivana Hensley  CSN #:                  [id number]  :                       1960  ADMIT DATE:       2022 7:40 PM  [address] [place] DATE:        2022 3:27 PM  RESPONDING  PROVIDER #:        Delia Frausto MD          QUERY TEXT:    Pt admitted with NSTEMI. Pt UDS noted to be positive for cocaine and noted to have an in-stent restenosis. If possible, please document in progress notes and discharge summary the relationship, if any, between NSTEMI and cocaine. The medical record reflects the following:  Risk Factors: cocaine use; CAD    Clinical Indicators:    UDS- Cocaine+, Opiates+, THC+    Left Heart Cath -  Diffuse proximal LAD in-stent restenosis with a focal high grade \"napkin ring\" stenosis just proximal to the first septal     Treatment: [facility]    Thank you,  Marycarmen Fernandes RN, BSN, CRCR, CCDS, SMART  Clinical Documentation Improvement  [phone] or via [employer]  Options provided:  -- NSTEMI due to cocaine use  -- NSTEMI due to cocaine poisoning  -- NSTEMI due to in-stent restenosis  -- NSTEMI due to cocaine use and in-stent restenosis  -- NSTEMI due to cocaine poisoning and in-stent restenosis  -- Other - I will add my own diagnosis  -- Disagree - Not applicable / Not valid  -- Disagree - Clinically unable to determine / Unknown  -- Refer to Clinical Documentation Reviewer    PROVIDER RESPONSE TEXT:    This patient has NSTEMI due to cocaine use.     Query created by: Demond Palma on 2022 10:12 AM      Electronically signed by:  Brissa Dorsey MD 2022 10:17 AM

## (undated) DEVICE — KIT COR DIAG CATH ANGIO 5FR CURVES JL 4.0 100CM JR 4.0

## (undated) DEVICE — PINNACLE INTRODUCER SHEATH: Brand: PINNACLE

## (undated) DEVICE — GUIDEWIRE VASC L260CM DIA0.035IN TIP L3MM STD EXCHG PTFE J

## (undated) DEVICE — PROVE COVER: Brand: UNBRANDED

## (undated) DEVICE — ANGIOGRAPHY KIT

## (undated) DEVICE — PACK PROCEDURE SURG HRT CATH

## (undated) DEVICE — KIT HND CTRL 3 W STPCOCK ROT END 54IN PREM HI PRSS TBNG AT

## (undated) DEVICE — KIT MED IMAG CNTRST AGNT W/ IOPAMIDOL REUSE

## (undated) DEVICE — KIT MFLD ISOLATN NACL CNTRST PRT TBNG SPIK W/ PRSS TRNSDUC